# Patient Record
Sex: MALE | Race: BLACK OR AFRICAN AMERICAN | URBAN - METROPOLITAN AREA
[De-identification: names, ages, dates, MRNs, and addresses within clinical notes are randomized per-mention and may not be internally consistent; named-entity substitution may affect disease eponyms.]

---

## 2017-06-12 ENCOUNTER — OFFICE VISIT (OUTPATIENT)
Dept: FAMILY MEDICINE | Facility: CLINIC | Age: 42
End: 2017-06-12

## 2017-06-12 VITALS
TEMPERATURE: 98.7 F | HEART RATE: 103 BPM | SYSTOLIC BLOOD PRESSURE: 99 MMHG | BODY MASS INDEX: 17.64 KG/M2 | DIASTOLIC BLOOD PRESSURE: 63 MMHG | OXYGEN SATURATION: 97 % | WEIGHT: 121.2 LBS

## 2017-06-12 DIAGNOSIS — J30.2 SEASONAL ALLERGIC RHINITIS: ICD-10-CM

## 2017-06-12 DIAGNOSIS — M54.50 LEFT-SIDED LOW BACK PAIN WITHOUT SCIATICA, UNSPECIFIED CHRONICITY: ICD-10-CM

## 2017-06-12 DIAGNOSIS — M54.2 CERVICALGIA: ICD-10-CM

## 2017-06-12 DIAGNOSIS — F17.200 TOBACCO USE DISORDER: ICD-10-CM

## 2017-06-12 DIAGNOSIS — J20.9 ACUTE BRONCHITIS, UNSPECIFIED ORGANISM: Primary | ICD-10-CM

## 2017-06-12 DIAGNOSIS — R35.0 URINARY FREQUENCY: ICD-10-CM

## 2017-06-12 LAB
% GRANULOCYTES: 44.4 %G (ref 40–75)
BACTERIA: NORMAL
BILIRUBIN UR: ABNORMAL
BLOOD UR: ABNORMAL
CASTS: NORMAL /LPF
CLARITY, URINE: CLEAR
COLOR UR: YELLOW
CRYSTAL URINE: NORMAL /LPF
EPITHELIAL CELLS UR: NORMAL /LPF (ref 0–2)
GLUCOSE URINE: NEGATIVE
GRANULOCYTES #: 1.7 K/UL (ref 1.6–8.3)
HCT VFR BLD AUTO: 43.9 % (ref 40–53)
HEMOGLOBIN: 14.2 G/DL (ref 13.3–17.7)
KETONES UR QL: ABNORMAL
LEUKOCYTE ESTERASE UR: NEGATIVE
LYMPHOCYTES # BLD AUTO: 1.5 K/UL (ref 0.8–5.3)
LYMPHOCYTES NFR BLD AUTO: 39.7 %L (ref 20–48)
MCH RBC QN AUTO: 33.1 PG (ref 26.5–35)
MCHC RBC AUTO-ENTMCNC: 32.3 G/DL (ref 32–36)
MCV RBC AUTO: 102.3 FL (ref 78–100)
MID #: 0.6 K/UL (ref 0–2.2)
MID %: 15.9 %M (ref 0–20)
MUCOUS URINE: NORMAL LPF
NITRITE UR QL STRIP: NEGATIVE
PH UR STRIP: 7 [PH] (ref 5–7)
PLATELET # BLD AUTO: 196 K/UL (ref 150–450)
PROTEIN UR: ABNORMAL
RBC # BLD AUTO: 4.29 M/UL (ref 4.4–5.9)
RBC URINE: <2 /HPF
SP GR UR STRIP: 1.01
UROBILINOGEN UR STRIP-ACNC: 4
WBC # BLD AUTO: 3.8 K/UL (ref 4–11)
WBC URINE: NORMAL /HPF

## 2017-06-12 RX ORDER — CYCLOBENZAPRINE HCL 5 MG
5 TABLET ORAL
Qty: 20 TABLET | Refills: 0
Start: 2017-06-12

## 2017-06-12 RX ORDER — ALBUTEROL SULFATE 90 UG/1
AEROSOL, METERED RESPIRATORY (INHALATION)
Qty: 1 INHALER | Refills: 0
Start: 2017-06-12

## 2017-06-12 RX ORDER — FLUTICASONE PROPIONATE 50 MCG
1 SPRAY, SUSPENSION (ML) NASAL 2 TIMES DAILY
Qty: 1 BOTTLE | Refills: 1
Start: 2017-06-12
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
## 2017-06-12 NOTE — MR AVS SNAPSHOT
After Visit Summary   6/12/2017    Vaibhav Lott    MRN: [id number]           Patient Information     Date Of Birth          1975        Visit Information        Provider Department      6/12/2017 10:20 AM Aline Hutton APRN CNP [facility]        Today's Diagnoses     Acute bronchitis, unspecified organism    -  1    Tobacco use disorder        Cervicalgia        Left-sided low back pain without sciatica, unspecified chronicity        Urinary frequency        Seasonal allergic rhinitis           Follow-ups after your visit        Follow-up notes from your care team     Return in about 2 weeks (around 6/26/2017), or if symptoms worsen or fail to improve, for BP Recheck, Physical Exam, Lab Work.      Who to contact     Please call your clinic at [phone] to:    Ask questions about your health    Make or cancel appointments    Discuss your medicines    Learn about your test results    Speak to your doctor   If you have compliments or concerns about an experience at your clinic, or if you wish to file a complaint, please contact [facility] Patient Relations at [phone] or email us at [email or web address]         Additional Information About Your Visit        Care EveryWhere ID     This is your Care EveryWhere ID. This could be used by other organizations to access your [place] medical records  [id number]        Your Vitals Were     Pulse Temperature Pulse Oximetry BMI (Body Mass Index)          103 98.7  F (37.1  C) (Oral) 97% 17.64 kg/m2         Blood Pressure from Last 3 Encounters:   06/12/17 99/63   03/17/16 120/81   01/21/15 114/73    Weight from Last 3 Encounters:   06/12/17 121 lb 3.2 oz (55 kg)   03/17/16 125 lb 12.8 oz (57.1 kg)   01/21/15 124 lb (56.2 kg)              We Performed the Following     CBC with Diff Plt ([facility])     Urinalysis, Micro If ([facility])     Urine Microscopic ([facility])     XR CHEST 2 VW           Today's Medication Changes          These changes are accurate as of: 6/12/17 11:59 PM.  If you have any questions, ask your nurse or doctor.               Start taking these medicines.        Dose/Directions    albuterol 108 (90 BASE) MCG/ACT Inhaler   Commonly known as:  PROAIR HFA/PROVENTIL HFA/VENTOLIN HFA   Used for:  Acute bronchitis, unspecified organism   Started by:  Aline Hutton APRN CNP        2 puffs into the lungs as needed every 4 hours for cough or shortness of breath.   Quantity:  1 Inhaler   Refills:  0       cyclobenzaprine 5 MG tablet   Commonly known as:  FLEXERIL   Used for:  Cervicalgia, Left-sided low back pain without sciatica, unspecified chronicity   Started by:  Alnie Hutton APRN CNP        Dose:  5 mg   Take 1 tablet (5 mg) by mouth nightly as needed (left neck and low back pain)   Quantity:  20 tablet   Refills:  0       fluticasone 50 MCG/ACT spray   Commonly known as:  FLONASE   Used for:  Seasonal allergic rhinitis   Started by:  Aline Hutton APRN CNP        Dose:  1 spray   Spray 1 spray into both nostrils 2 times daily   Quantity:  1 Bottle   Refills:  1            Where to get your medicines      Some of these will need a paper prescription and others can be bought over the counter.  Ask your nurse if you have questions.     You don't need a prescription for these medications     albuterol 108 (90 BASE) MCG/ACT Inhaler    cyclobenzaprine 5 MG tablet    fluticasone 50 MCG/ACT spray                Primary Care Provider Office Phone # Fax #    Misbah Palla, [phone]       [facility] [address] [place] [address]        Thank you!     Thank you for choosing [facility]  for your care. Our goal is always to provide you with excellent care. Hearing back from our patients is one way we can continue to improve our services. Please take a few minutes to complete the written survey that you may receive in the mail  after your visit with us. Thank you!             Your Updated Medication List - Protect others around you: Learn how to safely use, store and throw away your medicines at www.disposemymeds.org.          This list is accurate as of: 6/12/17 11:59 PM.  Always use your most recent med list.                   Brand Name Dispense Instructions for use    albuterol 108 (90 BASE) MCG/ACT Inhaler    PROAIR HFA/PROVENTIL HFA/VENTOLIN HFA    1 Inhaler    2 puffs into the lungs as needed every 4 hours for cough or shortness of breath.       cyclobenzaprine 5 MG tablet    FLEXERIL    20 tablet    Take 1 tablet (5 mg) by mouth nightly as needed (left neck and low back pain)       escitalopram 10 MG tablet    LEXAPRO    30 tablet    Take 1 tablet (10 mg) by mouth daily       fluticasone 50 MCG/ACT spray    FLONASE    1 Bottle    Spray 1 spray into both nostrils 2 times daily       HM VITAMIN D 1000 UNITS Tabs   Generic drug:  cholecalciferol      Take 1 tablet by mouth daily       hydrOXYzine 25 MG tablet    ATARAX    30 tablet    Take 2-4 tablets ( mg) by mouth nightly as needed for itching       nortriptyline 25 MG capsule    PAMELOR    30 capsule    Take one tablet at bedtime for 3 days, then increase to two tablets at night for the next 3 days, then increase to 3 tablets nightly.       SUMAtriptan 50 MG tablet    IMITREX    20 tablet    Take 1 tablet (50 mg) by mouth at onset of headache for migraine (If no relief after 2 hours, can take 2nd dose. Can take up to 300 mg/day or 6 tablets/day) May repeat dose in 2 hours.  Do not exceed 200 mg in 24 hours

## 2025-01-02 ENCOUNTER — TRANSFERRED RECORDS (OUTPATIENT)
Dept: HEALTH INFORMATION MANAGEMENT | Facility: CLINIC | Age: 50
End: 2025-01-02